# Patient Record
Sex: FEMALE
[De-identification: names, ages, dates, MRNs, and addresses within clinical notes are randomized per-mention and may not be internally consistent; named-entity substitution may affect disease eponyms.]

---

## 2024-02-01 ENCOUNTER — APPOINTMENT (OUTPATIENT)
Dept: AFTER HOURS CARE | Facility: EMERGENCY ROOM | Age: 39
End: 2024-02-01
Payer: COMMERCIAL

## 2024-02-01 DIAGNOSIS — H10.30 UNSPECIFIED ACUTE CONJUNCTIVITIS, UNSPECIFIED EYE: ICD-10-CM

## 2024-02-01 PROBLEM — Z00.00 ENCOUNTER FOR PREVENTIVE HEALTH EXAMINATION: Status: ACTIVE | Noted: 2024-02-01

## 2024-02-01 PROCEDURE — 99204 OFFICE O/P NEW MOD 45 MIN: CPT

## 2024-02-01 RX ORDER — POLYMYXIN B SULFATE AND TRIMETHOPRIM 10000; 1 [USP'U]/ML; MG/ML
10000-0.1 SOLUTION OPHTHALMIC
Qty: 1 | Refills: 0 | Status: ACTIVE | COMMUNITY
Start: 2024-02-01 | End: 1900-01-01

## 2024-02-01 NOTE — HISTORY OF PRESENT ILLNESS
[Home] : at home, [unfilled] , at the time of the visit. [Other Location: e.g. Home (Enter Location, City,State)___] : at [unfilled] [Verbal consent obtained from patient] : the patient, [unfilled] [FreeTextEntry8] : 37 yo F otherwise healthy female presents reporting 1 day of left eye with mucus, discharge and FB sensation.  Denies injury to eye.  Does not wear contact lenses.  No vision changes, no other symptoms.

## 2024-02-01 NOTE — ASSESSMENT
[FreeTextEntry1] : patient likely with acute bacterial conjunctivitis given one eye with no other symptoms, will treat with drops

## 2024-02-01 NOTE — PHYSICAL EXAM
[No Acute Distress] : no acute distress [Well Nourished] : well nourished [Well Developed] : well developed [Well-Appearing] : well-appearing [PERRL] : pupils equal round and reactive to light [EOMI] : extraocular movements intact [Normal Outer Ear/Nose] : the outer ears and nose were normal in appearance [No Respiratory Distress] : no respiratory distress  [Speech Grossly Normal] : speech grossly normal [de-identified] : L eye with injection, mucus and some eyelid inflammation, constant tearing.  No pain with EOM

## 2024-02-01 NOTE — REVIEW OF SYSTEMS
[Fever] : no fever [Chills] : no chills [Discharge] : discharge [Pain] : pain [Redness] : no redness [Vision Problems] : no vision problems [Itching] : itching [Earache] : no earache [Nasal Discharge] : nasal discharge [Cough] : no cough [Abdominal Pain] : no abdominal pain [Vomiting] : no vomiting